# Patient Record
Sex: FEMALE | Race: BLACK OR AFRICAN AMERICAN | NOT HISPANIC OR LATINO | Employment: OTHER | URBAN - METROPOLITAN AREA
[De-identification: names, ages, dates, MRNs, and addresses within clinical notes are randomized per-mention and may not be internally consistent; named-entity substitution may affect disease eponyms.]

---

## 2019-07-17 PROBLEM — E03.9 HYPOTHYROIDISM: Status: ACTIVE | Noted: 2019-07-17

## 2019-07-17 PROBLEM — E78.5 HLD (HYPERLIPIDEMIA): Status: ACTIVE | Noted: 2019-07-17

## 2019-07-17 PROBLEM — I10 ESSENTIAL HYPERTENSION: Status: ACTIVE | Noted: 2019-07-17

## 2019-07-17 PROBLEM — J30.2 SEASONAL ALLERGIES: Status: ACTIVE | Noted: 2019-07-17

## 2019-11-04 PROBLEM — R73.03 PRE-DIABETES: Status: ACTIVE | Noted: 2019-11-04

## 2019-11-19 PROBLEM — I71.40 ABDOMINAL AORTIC ANEURYSM: Status: ACTIVE | Noted: 2019-11-19

## 2020-04-06 ENCOUNTER — NURSE TRIAGE (OUTPATIENT)
Dept: ADMINISTRATIVE | Facility: CLINIC | Age: 65
End: 2020-04-06

## 2020-04-06 NOTE — TELEPHONE ENCOUNTER
Pt on call also. Pt c/o cough/weakness/body aches/diarrhea and vomiting x yesterday. Denies any other c/o. Currently body aches 9/10. Took Tylenol with no relief. Instructed to go to ER. Daughter then asked if they should still do visit. Told daughter that I'm not sure who she spoke with to make that appt or what criteria they based that on. Repeated x 3 that with vomiting and diarrhea pt should report to ER now. Daughter was concerned about cough and testing. Instructed needed to go to ER first to make sure pt is not dehydrated and can be tested if indicated after seen. She was upset stating that it's too much with the transferring of the calls and the appts and that she works at [employer] and she will get to bottom of it and asking who made pt appt.     Reason for Disposition   MODERATE vomiting (e.g., 3 - 5 times/day) and age > 60    Additional Information   Negative: Shock suspected (e.g., cold/pale/clammy skin, too weak to stand)   Negative: Difficult to awaken or acting confused (e.g., disoriented, slurred speech)   Negative: Sounds like a life-threatening emergency to the triager   Negative: Vomiting occurs only while coughing   Negative: Chest pain   Negative: Severe headache and similar to prior migraines   Negative: Pregnant < 20 Weeks and nausea/vomiting began in early pregnancy (i.e., 4-8 weeks pregnant)   Negative: SEVERE vomiting (e.g., 6 or more times/day)    Protocols used: ST VOMITING-A-[place]

## 2020-09-13 PROBLEM — R10.32 LEFT LOWER QUADRANT ABDOMINAL PAIN: Status: ACTIVE | Noted: 2020-09-13

## 2020-09-13 PROBLEM — R07.9 CHEST PAIN, UNSPECIFIED: Status: ACTIVE | Noted: 2020-09-13

## 2020-09-13 PROBLEM — K21.9 GASTROESOPHAGEAL REFLUX DISEASE WITHOUT ESOPHAGITIS: Status: ACTIVE | Noted: 2019-10-27

## 2020-10-22 PROBLEM — Z71.6 ENCOUNTER FOR SMOKING CESSATION COUNSELING: Status: ACTIVE | Noted: 2020-10-22

## 2020-10-22 PROBLEM — G89.29 CHRONIC BILATERAL LOW BACK PAIN WITHOUT SCIATICA: Status: ACTIVE | Noted: 2020-10-22

## 2020-10-22 PROBLEM — M54.50 CHRONIC BILATERAL LOW BACK PAIN WITHOUT SCIATICA: Status: ACTIVE | Noted: 2020-10-22

## 2021-01-09 PROBLEM — K31.A0 GASTRITIS WITH INTESTINAL METAPLASIA OF STOMACH: Status: ACTIVE | Noted: 2021-01-09

## 2021-01-09 PROBLEM — Z86.010 HISTORY OF COLONIC POLYPS: Status: ACTIVE | Noted: 2021-01-09

## 2021-01-09 PROBLEM — K29.70 GASTRITIS WITH INTESTINAL METAPLASIA OF STOMACH: Status: ACTIVE | Noted: 2021-01-09

## 2021-01-09 PROBLEM — Z86.0100 HISTORY OF COLONIC POLYPS: Status: ACTIVE | Noted: 2021-01-09

## 2021-01-09 PROBLEM — R91.1 INCIDENTAL PULMONARY NODULE: Status: ACTIVE | Noted: 2017-11-30

## 2021-01-09 PROBLEM — D64.9 SYMPTOMATIC ANEMIA: Status: ACTIVE | Noted: 2021-01-09

## 2021-01-11 PROBLEM — D64.9 SYMPTOMATIC ANEMIA: Status: RESOLVED | Noted: 2021-01-09 | Resolved: 2021-01-11

## 2021-01-12 PROBLEM — H25.813 COMBINED FORMS OF AGE-RELATED CATARACT OF BOTH EYES: Status: ACTIVE | Noted: 2021-01-12

## 2021-02-10 PROBLEM — H25.812 COMBINED FORM OF AGE-RELATED CATARACT, LEFT EYE: Status: ACTIVE | Noted: 2021-01-12

## 2021-04-22 ENCOUNTER — PATIENT OUTREACH (OUTPATIENT)
Dept: ADMINISTRATIVE | Facility: HOSPITAL | Age: 66
End: 2021-04-22

## 2021-04-22 DIAGNOSIS — E11.9 TYPE 2 DIABETES MELLITUS WITHOUT COMPLICATION, WITHOUT LONG-TERM CURRENT USE OF INSULIN: Primary | ICD-10-CM

## 2021-06-21 PROBLEM — M25.512 ACUTE PAIN OF LEFT SHOULDER: Status: ACTIVE | Noted: 2021-06-21

## 2022-03-13 PROBLEM — R59.0 CERVICAL LYMPHADENOPATHY: Status: ACTIVE | Noted: 2022-03-13

## 2022-04-30 PROBLEM — D50.9 IRON DEFICIENCY ANEMIA: Status: ACTIVE | Noted: 2022-04-30

## 2022-04-30 PROBLEM — R55 SYNCOPE: Status: ACTIVE | Noted: 2022-04-30

## 2022-08-17 PROBLEM — H92.09 OTALGIA: Status: ACTIVE | Noted: 2022-08-17

## 2023-02-16 PROBLEM — D32.0 MENINGIOMA, CEREBRAL: Status: ACTIVE | Noted: 2023-02-16

## 2023-02-16 PROBLEM — M50.30 DDD (DEGENERATIVE DISC DISEASE), CERVICAL: Status: ACTIVE | Noted: 2023-02-16

## 2023-03-15 DIAGNOSIS — E11.9 TYPE 2 DIABETES MELLITUS WITHOUT COMPLICATION: ICD-10-CM

## 2023-03-17 ENCOUNTER — PATIENT MESSAGE (OUTPATIENT)
Dept: ADMINISTRATIVE | Facility: HOSPITAL | Age: 68
End: 2023-03-17
Payer: MEDICARE

## 2023-03-22 ENCOUNTER — PATIENT OUTREACH (OUTPATIENT)
Dept: ADMINISTRATIVE | Facility: HOSPITAL | Age: 68
End: 2023-03-22
Payer: MEDICARE

## 2023-03-22 DIAGNOSIS — E11.9 TYPE 2 DIABETES MELLITUS WITHOUT COMPLICATION, WITHOUT LONG-TERM CURRENT USE OF INSULIN: Primary | ICD-10-CM

## 2023-03-25 PROBLEM — E11.9 TYPE 2 DIABETES MELLITUS WITHOUT COMPLICATION, WITHOUT LONG-TERM CURRENT USE OF INSULIN: Chronic | Status: ACTIVE | Noted: 2023-03-25

## 2023-07-04 PROBLEM — K92.0 HEMATEMESIS WITH NAUSEA: Status: ACTIVE | Noted: 2023-07-04

## 2023-07-04 PROBLEM — R10.30 LOWER ABDOMINAL PAIN: Status: ACTIVE | Noted: 2020-09-13

## 2023-07-05 PROBLEM — K21.9 GASTROESOPHAGEAL REFLUX DISEASE WITHOUT ESOPHAGITIS: Chronic | Status: ACTIVE | Noted: 2019-10-27

## 2023-07-05 PROBLEM — E83.51 HYPOCALCEMIA: Status: ACTIVE | Noted: 2023-07-05

## 2023-07-05 PROBLEM — K31.A0 GASTRITIS WITH INTESTINAL METAPLASIA OF STOMACH: Chronic | Status: ACTIVE | Noted: 2021-01-09

## 2023-07-05 PROBLEM — J30.2 SEASONAL ALLERGIES: Chronic | Status: ACTIVE | Noted: 2019-07-17

## 2023-07-05 PROBLEM — R73.03 PRE-DIABETES: Chronic | Status: ACTIVE | Noted: 2019-11-04

## 2023-07-05 PROBLEM — Z71.6 ENCOUNTER FOR SMOKING CESSATION COUNSELING: Chronic | Status: ACTIVE | Noted: 2020-10-22

## 2023-07-05 PROBLEM — Z86.010 HISTORY OF COLONIC POLYPS: Chronic | Status: ACTIVE | Noted: 2021-01-09

## 2023-07-05 PROBLEM — D64.9 NORMOCYTIC ANEMIA: Chronic | Status: ACTIVE | Noted: 2021-01-09

## 2023-07-05 PROBLEM — G89.29 CHRONIC BILATERAL LOW BACK PAIN WITHOUT SCIATICA: Chronic | Status: ACTIVE | Noted: 2020-10-22

## 2023-07-05 PROBLEM — R91.1 INCIDENTAL PULMONARY NODULE: Chronic | Status: ACTIVE | Noted: 2017-11-30

## 2023-07-05 PROBLEM — M54.50 CHRONIC BILATERAL LOW BACK PAIN WITHOUT SCIATICA: Chronic | Status: ACTIVE | Noted: 2020-10-22

## 2023-07-05 PROBLEM — E87.6 HYPOKALEMIA: Status: ACTIVE | Noted: 2023-07-05

## 2023-07-05 PROBLEM — I10 ESSENTIAL HYPERTENSION: Chronic | Status: ACTIVE | Noted: 2019-07-17

## 2023-07-05 PROBLEM — E78.5 HLD (HYPERLIPIDEMIA): Chronic | Status: ACTIVE | Noted: 2019-07-17

## 2023-07-05 PROBLEM — Z86.0100 HISTORY OF COLONIC POLYPS: Chronic | Status: ACTIVE | Noted: 2021-01-09

## 2023-07-05 PROBLEM — K29.70 GASTRITIS WITH INTESTINAL METAPLASIA OF STOMACH: Chronic | Status: ACTIVE | Noted: 2021-01-09

## 2023-07-05 PROBLEM — H25.812 COMBINED FORM OF AGE-RELATED CATARACT, LEFT EYE: Chronic | Status: ACTIVE | Noted: 2021-01-12

## 2023-07-06 PROBLEM — E83.51 HYPOCALCEMIA: Status: RESOLVED | Noted: 2023-07-05 | Resolved: 2023-07-06

## 2023-07-06 PROBLEM — E87.6 HYPOKALEMIA: Status: RESOLVED | Noted: 2023-07-05 | Resolved: 2023-07-06

## 2023-07-06 PROBLEM — R10.30 LOWER ABDOMINAL PAIN: Status: RESOLVED | Noted: 2020-09-13 | Resolved: 2023-07-06

## 2023-07-07 ENCOUNTER — NURSE TRIAGE (OUTPATIENT)
Dept: ADMINISTRATIVE | Facility: CLINIC | Age: 68
End: 2023-07-07
Payer: MEDICARE

## 2023-07-07 NOTE — TELEPHONE ENCOUNTER
"Post discharge tracker DAY 1  escalation - Pt C/O black tarry BM with toilet water turning red  x 2 this morning. Rectal bleed protocol used and pt advised to go to ED now. Pt reports she is home alone but will call family member to drive her. Instructed go to Ed with in the hour and if unable to contact family to drive to call 911. Pt agrees and will call now. Encounter routed to Dr. KATIE Plunkett .  PCP is non [facility] provider.   Reason for Disposition   [1] MODERATE rectal bleeding (small blood clots, passing blood without stool, or toilet water turns red) AND [2] more than once a day    Additional Information   Black or tarry bowel movements   Rectal bleeding, bloody stools, or blood in stool (bowel movement)   Negative: Shock suspected (e.g., cold/pale/clammy skin, too weak to stand, low BP, rapid pulse)   Negative: Difficult to awaken or acting confused (e.g., disoriented, slurred speech)   Negative: Passed out (i.e., lost consciousness, collapsed and was not responding)   Negative: [1] Vomiting AND [2] contains red blood or black ("coffee ground") material  (Exception: Few red streaks in vomit that only happened once.)   Negative: Sounds like a life-threatening emergency to the triager   Negative: SEVERE rectal bleeding (large blood clots; constant or on and off bleeding)   Negative: SEVERE dizziness (e.g., unable to stand, requires support to walk, feels like passing out now)    Protocols used: Stools - Unusual Color-A-AH, Rectal Bleeding-A-AH    "

## 2023-07-09 ENCOUNTER — TELEPHONE (OUTPATIENT)
Dept: ADMINISTRATIVE | Facility: CLINIC | Age: 68
End: 2023-07-09
Payer: MEDICARE

## 2023-07-09 NOTE — TELEPHONE ENCOUNTER
Pt did not get Sumatriptan from pharmacy. Called [facility], but they are closed. Called pt back and made aware and asked if another pharmacy, but wants to wait until Monday when her pharmacy is open.

## 2023-07-22 PROBLEM — D32.9 MENINGIOMA: Status: ACTIVE | Noted: 2023-07-22

## 2023-07-27 ENCOUNTER — PATIENT OUTREACH (OUTPATIENT)
Dept: ADMINISTRATIVE | Facility: HOSPITAL | Age: 68
End: 2023-07-27
Payer: MEDICARE

## 2023-07-27 DIAGNOSIS — F17.200 TOBACCO DEPENDENCE: Primary | ICD-10-CM

## 2023-08-09 ENCOUNTER — TELEPHONE (OUTPATIENT)
Dept: SMOKING CESSATION | Facility: CLINIC | Age: 68
End: 2023-08-09
Payer: MEDICARE

## 2023-08-10 ENCOUNTER — TELEPHONE (OUTPATIENT)
Dept: SMOKING CESSATION | Facility: CLINIC | Age: 68
End: 2023-08-10
Payer: MEDICARE

## 2023-10-09 PROBLEM — K92.0 HEMATEMESIS WITH NAUSEA: Status: RESOLVED | Noted: 2023-07-04 | Resolved: 2023-10-09

## 2023-10-25 ENCOUNTER — TELEPHONE (OUTPATIENT)
Dept: ADMINISTRATIVE | Facility: HOSPITAL | Age: 68
End: 2023-10-25
Payer: MEDICAID

## 2023-10-25 VITALS — DIASTOLIC BLOOD PRESSURE: 71 MMHG | SYSTOLIC BLOOD PRESSURE: 138 MMHG

## 2024-01-25 PROBLEM — R82.90 FOUL SMELLING URINE: Status: ACTIVE | Noted: 2024-01-25

## 2024-05-23 PROBLEM — G43.E09 CHRONIC MIGRAINE WITH AURA WITHOUT STATUS MIGRAINOSUS, NOT INTRACTABLE: Status: ACTIVE | Noted: 2024-05-23

## 2024-06-13 PROBLEM — M85.80 OSTEOPENIA: Chronic | Status: ACTIVE | Noted: 2024-06-13

## 2024-11-20 PROBLEM — Z87.891 PERSONAL HISTORY OF TOBACCO USE, PRESENTING HAZARDS TO HEALTH: Status: ACTIVE | Noted: 2024-11-20

## 2025-01-21 ENCOUNTER — PATIENT OUTREACH (OUTPATIENT)
Dept: ADMINISTRATIVE | Facility: HOSPITAL | Age: 70
End: 2025-01-21
Payer: MEDICAID

## 2025-01-21 DIAGNOSIS — E11.9 TYPE 2 DIABETES MELLITUS WITHOUT COMPLICATION, WITHOUT LONG-TERM CURRENT USE OF INSULIN: Primary | Chronic | ICD-10-CM

## 2025-02-18 PROBLEM — K31.A0 INTESTINAL METAPLASIA OF STOMACH: Status: ACTIVE | Noted: 2025-02-18

## 2025-02-18 PROBLEM — K29.40 CHRONIC ATROPHIC GASTRITIS: Status: ACTIVE | Noted: 2025-02-18

## 2025-03-13 PROBLEM — D50.0 IRON DEFICIENCY ANEMIA DUE TO CHRONIC BLOOD LOSS: Status: ACTIVE | Noted: 2025-03-13

## 2025-04-24 PROBLEM — N18.31 CKD STAGE 3A, GFR 45-59 ML/MIN: Status: ACTIVE | Noted: 2025-04-24

## 2025-04-24 PROBLEM — R80.9 ALBUMINURIA: Status: ACTIVE | Noted: 2025-04-24
